# Patient Record
Sex: FEMALE | Race: OTHER | Employment: UNEMPLOYED | URBAN - METROPOLITAN AREA
[De-identification: names, ages, dates, MRNs, and addresses within clinical notes are randomized per-mention and may not be internally consistent; named-entity substitution may affect disease eponyms.]

---

## 2024-01-06 ENCOUNTER — TELEPHONE (OUTPATIENT)
Age: 64
End: 2024-01-06

## 2024-01-06 ENCOUNTER — OFFICE VISIT (OUTPATIENT)
Age: 64
End: 2024-01-06

## 2024-01-06 VITALS
SYSTOLIC BLOOD PRESSURE: 124 MMHG | HEART RATE: 79 BPM | OXYGEN SATURATION: 97 % | TEMPERATURE: 98.7 F | BODY MASS INDEX: 28.22 KG/M2 | DIASTOLIC BLOOD PRESSURE: 84 MMHG | WEIGHT: 151.4 LBS

## 2024-01-06 DIAGNOSIS — H81.12 BPPV (BENIGN PAROXYSMAL POSITIONAL VERTIGO), LEFT: Primary | ICD-10-CM

## 2024-01-06 RX ORDER — MECLIZINE HYDROCHLORIDE 25 MG/1
25 TABLET ORAL 3 TIMES DAILY PRN
Qty: 30 TABLET | Refills: 0 | Status: SHIPPED | OUTPATIENT
Start: 2024-01-06 | End: 2024-01-16

## 2024-01-06 NOTE — PROGRESS NOTES
Janet Cruz (:  1960) is a 63 y.o. female,Established patient, here for evaluation of the following chief complaint(s):  Dizziness (Vertigo, seen 2023, but symptoms  returned yesterday/Leaves Monday needs a refill in the meclizine also states she has right ear pain)      ASSESSMENT/PLAN:  Visit Diagnoses and Associated Orders       BPPV (benign paroxysmal positional vertigo), left    -  Primary    meclizine (ANTIVERT) 25 MG tablet [[id number]]                    Positive morales hallpike maneuver to the left. Neuro examination grossly intact, no red flag symptoms. TM effusions noted, no evidence of OM. No hearing loss or tinnitus, Menière disease and CNS lesion/CVA low in Ddx. Suspect BPPV to the L. Resolution of symptoms for 1 week with meclizine prescribed on 23, returned a ~ 1 week after discontinuing the medication. Patient has a follow-up appt with her ENT provider in [place] on 1/10/2023. Meclizine refilled, to be used PRN until follow-up appt with ENT. Instructions provided to perform Epley maneuver to the L at home. To monitor symptoms and follow-up at  if symptoms worsen or do not improve on current treatment plan. To ED for numbness/weakness/tingling of any extremity, slurred speech, dysarthria, dysphagia, ataxia, blurred vision, diplopia, visual loss, lightheadedness, presyncope/syncope, facial droop, CP/chest tightness/SOB, hearing loss, tinnitus.       Follow up in PRN days if symptoms persist or if symptoms worsen.    SUBJECTIVE/OBJECTIVE:  HPI  HPI:   63 y.o. female presents with symptoms of Vertigo  Patient complains of rotary vertigo, numbness of head.  The symptoms started 10 days ago and are gradually worsening.  The attacks occur every a few hours and last 1minute. Positions that worsen symptoms: lying down.Previous workup/treatments: none. Associated ear symptoms: aural pressure in the right ear which is ongoing  otalgia in the left ear which is ongoing. She

## 2024-01-06 NOTE — TELEPHONE ENCOUNTER
Pt was seen 12/21 she was given  Meclizine for dizziness , her dizziness has started again she is leaving to go back to [place] in 4 days , her daughter and son in law want to know if the provider is able to send in enough  Meclizine for 4-5 days just until she can be seen in [place] ?